# Patient Record
Sex: MALE | URBAN - METROPOLITAN AREA
[De-identification: names, ages, dates, MRNs, and addresses within clinical notes are randomized per-mention and may not be internally consistent; named-entity substitution may affect disease eponyms.]

---

## 2023-01-18 ENCOUNTER — NURSE TRIAGE (OUTPATIENT)
Dept: NURSING | Facility: CLINIC | Age: 23
End: 2023-01-18
Payer: COMMERCIAL

## 2023-01-18 NOTE — TELEPHONE ENCOUNTER
Nurse Triage SBAR    Is this a 2nd Level Triage? NO    Situation: Shoulder injury    Background: patient states that he was snowboarding yesterday and injured his shoulder. States that the pain kept him up last night. States that it is throbbing and it is hard to lift he right arm. He also states that it does look crooked. He denies any bleeding. Denies any numbness and tingling.     Assessment: Right shoulder injury    Protocol Recommended Disposition:   Go to ED Now    Recommendation:     patient states that he will go to the ED now.      N/A     MINA WILLIAMSON RN      Does the patient meet one of the following criteria for ADS visit consideration? No    Reason for Disposition    Looks like a broken bone or dislocated joint (crooked or deformed)    Additional Information    Negative: Major bleeding (actively dripping or spurting) that can't be stopped    Negative: Amputation or bone sticking through the skin    Negative: Bullet, stabbed by knife or other serious penetrating wound    Negative: Serious injury with multiple fractures (broken bones)    Negative: Sounds like a life-threatening emergency to the triager    Negative: Wound looks infected    Protocols used: SHOULDER INJURY-A-OH